# Patient Record
Sex: FEMALE | URBAN - METROPOLITAN AREA
[De-identification: names, ages, dates, MRNs, and addresses within clinical notes are randomized per-mention and may not be internally consistent; named-entity substitution may affect disease eponyms.]

---

## 2018-05-02 ENCOUNTER — APPOINTMENT (RX ONLY)
Dept: URBAN - METROPOLITAN AREA CLINIC 45 | Facility: CLINIC | Age: 68
Setting detail: DERMATOLOGY
End: 2018-05-02

## 2018-05-02 DIAGNOSIS — Z85.828 PERSONAL HISTORY OF OTHER MALIGNANT NEOPLASM OF SKIN: ICD-10-CM

## 2018-05-02 DIAGNOSIS — L85.3 XEROSIS CUTIS: ICD-10-CM

## 2018-05-02 DIAGNOSIS — L40.0 PSORIASIS VULGARIS: ICD-10-CM

## 2018-05-02 DIAGNOSIS — L57.0 ACTINIC KERATOSIS: ICD-10-CM

## 2018-05-02 PROBLEM — L20.84 INTRINSIC (ALLERGIC) ECZEMA: Status: ACTIVE | Noted: 2018-05-02

## 2018-05-02 PROCEDURE — ? OTEZLA INITIATION

## 2018-05-02 PROCEDURE — ? COUNSELING

## 2018-05-02 PROCEDURE — 99203 OFFICE O/P NEW LOW 30 MIN: CPT

## 2018-05-02 PROCEDURE — ? TREATMENT REGIMEN

## 2018-05-02 PROCEDURE — ? PHOTODYNAMIC THERAPY COUNSELING

## 2018-05-02 PROCEDURE — ? OTEZLA MONITORING

## 2018-05-02 PROCEDURE — ? PRESCRIPTION

## 2018-05-02 RX ORDER — CLOBETASOL PROPIONATE 0.5 MG/G
AEROSOL, FOAM TOPICAL
Qty: 1 | Refills: 3 | Status: ERX | COMMUNITY
Start: 2018-05-02

## 2018-05-02 RX ADMIN — CLOBETASOL PROPIONATE 1G: 0.5 AEROSOL, FOAM TOPICAL at 00:00

## 2018-05-02 ASSESSMENT — LOCATION ZONE DERM: LOCATION ZONE: NOSE

## 2018-05-02 ASSESSMENT — LOCATION DETAILED DESCRIPTION DERM
LOCATION DETAILED: NASAL DORSUM
LOCATION DETAILED: NASAL ROOT

## 2018-05-02 ASSESSMENT — LOCATION SIMPLE DESCRIPTION DERM: LOCATION SIMPLE: NOSE

## 2018-05-02 NOTE — PROCEDURE: TREATMENT REGIMEN
Detail Level: Zone
Plan: Recommended PDT for the face in fall/winter
Action 4: Continue
Other Instructions: ***Otezla 14 day titration pack given to pt today. Instructed pt to finish titration pack then start Otezla supply that will be shipped to her house until her next f/u appt***\\n\\nLocation: body\\nPrescribe: Olux E foam, 1 TOP YOLI QD when having flare ups\\nOtezla: 28 day Starter pack (14 day titration starter pack sample provided by MD office) and maintenance (30 mg PO twice daily)\\nCONT: Hydroxyzine HCL 25mg QHS to relieve itch-- pt has prescription, will not send rx today\\nDuration: 14 day titration pack given to pt\\nWeight: 135\\nHeight: 5'5\"\\nBSA: 13%\\n\\nPt presents erythematous patches and dry scaly plaques throughout her body\\nToday, pt complains of experiencing severe itching and discomfort--- PT STATES THAT HER ITCHING AND SKIN FLARE UPS HAVE PERSISTED FOR ROUGHLY 2.5 YEARS. TODAY, PT IS VERY DISTRESSED AND STATES HER CONDITION IS NEGATIVELY AFFECTING HER QUALITY OF LIFE.\\nToday, pt also presents scars and scabs that have formed from constant scratching\\nDiscussed with pt that her flare up has eczematous qualities, indicated by itch\\n\\nPatient has tried and failed (to relieve flare ups and itch):\\nA. Eliminating intake of all foods that may contribute to or trigger her skin flare ups\\nB. Clorox baths\\nC. Prescription topicals: tacrolimus 0.1% ointment, desoximetasone 0.05% cream, metronidazole 1% gel\\nD. Oral medications: levocetirizine 5 mg at night, loratadine 10 mg in the morning, cetirizine 10 mg twice daily, promethazine 50 mg once daily when having flare ups, Benadryl, hydroxyzine HCL 25 mg every 8 hours--- PT STATES THAT SHE DOES NOT TAKE ALL OF THESE ORAL MEDICATIONS AT ONCE. PT WAS INSTRUCTED BY PREVIOUS DERMATOLOGIST TO STACK/ COMBINE MEDICATIONS THAT BEST RELIEVE HER FLARE UPS\\n\\nToday, pt also states that she is either prescribed a prednisone pack or gets a steroid injection every 6 months to relieve itching and irritation on her skin--- pt states these are effective in relieving her itch (for a short period of time) but they do not clear the erythematous patches and dry, scaly plaques found on her body\\n\\nDiscussed with pt that because her condition has not improved with use of topicals, antihistamines, steroid injections, etc., will start her on Otezla with hopes that she will have better clearance of her psoriasis/ eczematous flare up and decrease of itch\\n\\nWill prescribe:\\nOtezla: 28 day Starter pack (14 day titration starter pack sample provided by MD office) and maintenance (30 mg PO twice daily)-- informed pt that Otezla can often cause diarrhea/ pt can take anti-diarrhea medication if needed\\nOlux E foam, 1 TOP YOLI QD when having flare ups--- discussed with pt that Olux E foam is a stronger steroid than those she has tried in the past, foam may be effective in calming her flare ups\\nCONT: Hydroxyzine HCL 25mg QHS to relieve itch-- pt has prescription, will not send rx today\\n\\nWill also start pt on:\\nCLN body wash QD when having flare ups -- discussed with pt that wash can be used in place of Clorox baths to relieve irritation\\nOTC Cerave Healing Ointment to re-establish and protect skin barrier\\nAllegra (fexofenadine), 1 TAB PO QD-- discussed with pt that fexofenadine is a good antihistamine for the skin; discussed with pt that she may continue cetirizine as substitute if wanted; discussed with pt that lowering histamine levels will decrease immune system and skin hypersensitivity \\n\\nDiscussed with pt that if Otezla is not effective, can start pt on Dupixent \\nInstructed pt to limit soap usage to private area and underarms\\nLab slip given to pt due ASAP: Acute hepatitis panel, CMP, CBC with diff, lipid panel, HIV, quantiferon gold\\n\\nF/u in 1 month\\n\\n

## 2018-05-17 ENCOUNTER — RX ONLY (OUTPATIENT)
Age: 68
Setting detail: RX ONLY
End: 2018-05-17

## 2018-05-17 RX ORDER — APREMILAST 30 MG/1
TABLET, FILM COATED ORAL
Qty: 60 | Refills: 6 | Status: ERX | COMMUNITY
Start: 2018-05-17

## 2018-06-06 ENCOUNTER — RX ONLY (OUTPATIENT)
Age: 68
Setting detail: RX ONLY
End: 2018-06-06

## 2018-06-06 ENCOUNTER — APPOINTMENT (RX ONLY)
Dept: URBAN - METROPOLITAN AREA CLINIC 45 | Facility: CLINIC | Age: 68
Setting detail: DERMATOLOGY
End: 2018-06-06

## 2018-06-06 DIAGNOSIS — L85.3 XEROSIS CUTIS: ICD-10-CM

## 2018-06-06 DIAGNOSIS — L40.0 PSORIASIS VULGARIS: ICD-10-CM

## 2018-06-06 DIAGNOSIS — L57.0 ACTINIC KERATOSIS: ICD-10-CM

## 2018-06-06 DIAGNOSIS — Z85.828 PERSONAL HISTORY OF OTHER MALIGNANT NEOPLASM OF SKIN: ICD-10-CM

## 2018-06-06 PROCEDURE — ? OTEZLA MONITORING

## 2018-06-06 PROCEDURE — ? PRESCRIPTION

## 2018-06-06 PROCEDURE — ? PHOTODYNAMIC THERAPY COUNSELING

## 2018-06-06 PROCEDURE — 99213 OFFICE O/P EST LOW 20 MIN: CPT

## 2018-06-06 PROCEDURE — ? TREATMENT REGIMEN

## 2018-06-06 PROCEDURE — ? COUNSELING

## 2018-06-06 RX ORDER — HYDROCORTISONE ACETATE, IODOQUINOL 19; 10 MG/G; MG/G
CREAM TOPICAL
Qty: 30 | Refills: 3 | Status: ERX | COMMUNITY
Start: 2018-06-06

## 2018-06-06 RX ORDER — ONDANSETRON HYDROCHLORIDE 4 MG/1
TABLET, FILM COATED ORAL
Qty: 30 | Refills: 1 | Status: ERX | COMMUNITY
Start: 2018-06-06

## 2018-06-06 RX ADMIN — HYDROCORTISONE ACETATE, IODOQUINOL 1G: 19; 10 CREAM TOPICAL at 00:00

## 2018-06-06 ASSESSMENT — LOCATION DETAILED DESCRIPTION DERM
LOCATION DETAILED: NASAL DORSUM
LOCATION DETAILED: NASAL ROOT

## 2018-06-06 ASSESSMENT — LOCATION ZONE DERM: LOCATION ZONE: NOSE

## 2018-06-06 ASSESSMENT — LOCATION SIMPLE DESCRIPTION DERM: LOCATION SIMPLE: NOSE

## 2018-06-06 NOTE — PROCEDURE: TREATMENT REGIMEN
Other Instructions: Location: Body\\nPrescribe: Olux E foam, 1 TOP YOLI QD when having flare ups\\nOtezla: Continue Otezla 30mg BID (patient will only take it once a day for the next week to see if this helps with the nausea she is experiencing from otezla. Patient states she has daily and nightly nausea and routinely takes Zofran with her Otezla to combat this.)\\nCONT: Hydroxyzine HCL 25mg QHS to relieve itch-- pt has prescription, will not send rx today\\n            Olux-E .05% topical Foam PRN or once a day when needed \\nVytone Cream FOR FACE IF NEEDED \\nPharmacy: [facility]\\nWeight: 130\\nHeight: 5'5\"\\nBSA: 13%\\n\\n\\nPatient is here for follow up and is responding well to Otezla and Olux E Foam.\\nToday, patient’s skin looks much better and has improved significantly, and only presents slight eczematous skin on the upper arm that she is treating with Olux and Cerave. \\nPt presented erythematous patches and dry scaly plaques throughout her body\\nPT STATES THAT HER ITCHING AND SKIN FLARE UPS HAVE PERSISTED FOR ROUGHLY 2.5 YEARS. TODAY, PT IS VERY DISTRESSED AND STATES HER CONDITION IS NEGATIVELY AFFECTING HER QUALITY OF LIFE.\\nToday, pt also presents scars and scabs that have formed from constant scratching\\nDiscussed with pt that her flare up has eczematous qualities, indicated by itch\\n\\nPatient has tried and failed (to relieve flare ups and itch):\\nA. Eliminating intake of all foods that may contribute to or trigger her skin flare ups\\nB. Clorox baths\\nC. Prescription topicals: tacrolimus 0.1% ointment, desoximetasone 0.05% cream, metronidazole 1% gel\\nD. Oral medications: levocetirizine 5 mg at night, loratadine 10 mg in the morning, cetirizine 10 mg twice daily, promethazine 50 mg once daily when having flare ups, Benadryl, hydroxyzine HCL 25 mg every 8 hours--- PT STATES THAT SHE DOES NOT TAKE ALL OF THESE ORAL MEDICATIONS AT ONCE. PT WAS INSTRUCTED BY PREVIOUS DERMATOLOGIST TO STACK/ COMBINE MEDICATIONS THAT BEST RELIEVE HER FLARE UPS\\n\\nToday, pt also states that she is either prescribed a prednisone pack or gets a steroid injection every 6 months to relieve itching and irritation on her skin--- pt states these are effective in relieving her itch (for a short period of time) but they do not clear the erythematous patches and dry, scaly plaques found on her body\\n\\nDiscussed with pt that because her condition has not improved with use of topicals, antihistamines, steroid injections, etc., will start her on Otezla with hopes that she will have better clearance of her psoriasis/ eczematous flare up and decrease of itch\\n\\nWill prescribe:\\nOtezla: 28 day Starter pack (14 day titration starter pack sample provided by MD office) and maintenance (30 mg PO twice daily)-- informed pt that Otezla can often cause diarrhea/ pt can take anti-diarrhea medication if needed\\nOlux E foam, 1 TOP YOLI QD when having flare ups--- discussed with pt that Olux E foam is a stronger steroid than those she has tried in the past, foam may be effective in calming her flare ups\\nCONT: Hydroxyzine HCL 25mg QHS to relieve itch-- pt has prescription, will not send rx today\\n\\nWill also start pt on:\\nCLN body wash QD when having flare ups -- discussed with pt that wash can be used in place of Clorox baths to relieve irritation\\nOTC Cerave Healing Ointment to re-establish and protect skin barrier\\nAllegra (fexofenadine), 1 TAB PO QD-- discussed with pt that fexofenadine is a good antihistamine for the skin; discussed with pt that she may continue cetirizine as substitute if wanted; discussed with pt that lowering histamine levels will decrease immune system and skin hypersensitivity \\n\\nDiscussed with pt that if Otezla is not effective, can start pt on Dupixent \\nInstructed pt to limit soap usage to private area and underarms\\nLab slip given to pt due ASAP: Acute hepatitis panel, CMP, CBC with diff, lipid panel, HIV, quantiferon gold\\n\\nF/u in 1 month
Action 3: Continue
Detail Level: Zone
Plan: Recommended PDT for the face in fall/winter

## 2018-07-18 ENCOUNTER — APPOINTMENT (RX ONLY)
Dept: URBAN - METROPOLITAN AREA CLINIC 45 | Facility: CLINIC | Age: 68
Setting detail: DERMATOLOGY
End: 2018-07-18

## 2018-07-18 VITALS — HEIGHT: 64 IN | WEIGHT: 115 LBS

## 2018-07-18 DIAGNOSIS — L40.0 PSORIASIS VULGARIS: ICD-10-CM

## 2018-07-18 DIAGNOSIS — L85.3 XEROSIS CUTIS: ICD-10-CM

## 2018-07-18 DIAGNOSIS — L57.0 ACTINIC KERATOSIS: ICD-10-CM

## 2018-07-18 DIAGNOSIS — Z85.828 PERSONAL HISTORY OF OTHER MALIGNANT NEOPLASM OF SKIN: ICD-10-CM

## 2018-07-18 PROCEDURE — ? PHOTODYNAMIC THERAPY COUNSELING

## 2018-07-18 PROCEDURE — ? OTEZLA MONITORING

## 2018-07-18 PROCEDURE — ? COUNSELING

## 2018-07-18 PROCEDURE — ? TREATMENT REGIMEN

## 2018-07-18 PROCEDURE — 99214 OFFICE O/P EST MOD 30 MIN: CPT

## 2018-07-18 ASSESSMENT — LOCATION ZONE DERM: LOCATION ZONE: NOSE

## 2018-07-18 ASSESSMENT — LOCATION DETAILED DESCRIPTION DERM
LOCATION DETAILED: NASAL DORSUM
LOCATION DETAILED: NASAL ROOT

## 2018-07-18 ASSESSMENT — LOCATION SIMPLE DESCRIPTION DERM: LOCATION SIMPLE: NOSE

## 2018-07-18 NOTE — PROCEDURE: TREATMENT REGIMEN
Action 3: Continue
Other Instructions: Location: Body\\nPrescribe: Olux E foam, 1 TOP YOLI QD when having flare ups\\nOtezla: Continue Otezla 30mg BID (patient will only take it once a day for the next week to see if this helps with the nausea she is experiencing from otezla. Patient states she has daily and nightly nausea and routinely takes Zofran with her Otezla to combat this.)\\nCONT: Hydroxyzine HCL 25mg QHS to relieve itch-- pt has prescription, will not send rx today\\n            Olux-E .05% topical Foam PRN or once a day when needed \\nVytone Cream FOR FACE IF NEEDED \\nPharmacy: [facility]\\nWeight: 130\\nHeight: 5'5\"\\nBSA: 13%\\n\\nPt is here for 6 weeks f/u.\\nPt discussed iman she has been taking Otezla BID, much improvmenet.\\nPt discussed that she started out taking one tablet daily since she was having some side effects.\\n\\nPt discusssed that when she takes medication at nifht \\n\\n\\n\\nPt discussed that she was taking Allegra BID, but stopped completely due to being clear.\\nPt discussed recently she had eaten food with soy in it and flared up on her sternum.\\n\\nPt states that she had a blister form and use Olux-E foam on it, which has helped with itch.\\n\\nPt also is using Vytone cream as needed on her ring finger and behind her ear (uses hearing aid) to help with irritation.\\nPt is very pleased with current treatment regimen and feels like she has her life back again.\\n\\nDiscussed with pt at some point in the future, when pt has been taking Otezla for awhile, we could slowly ween her off Otezla.\\nF/u in 3 months.\\n\\n——————\\nPatient is here for follow up and is responding well to Otezla and Olux E Foam.\\nToday, patient’s skin looks much better and has improved significantly, and only presents slight eczematous skin on the upper arm that she is treating with Olux and Cerave. \\nPt presented erythematous patches and dry scaly plaques throughout her body\\nPT STATES THAT HER ITCHING AND SKIN FLARE UPS HAVE PERSISTED FOR ROUGHLY 2.5 YEARS. TODAY, PT IS VERY DISTRESSED AND STATES HER CONDITION IS NEGATIVELY AFFECTING HER QUALITY OF LIFE.\\nToday, pt also presents scars and scabs that have formed from constant scratching\\nDiscussed with pt that her flare up has eczematous qualities, indicated by itch\\n\\nPatient has tried and failed (to relieve flare ups and itch):\\nA. Eliminating intake of all foods that may contribute to or trigger her skin flare ups\\nB. Clorox baths\\nC. Prescription topicals: tacrolimus 0.1% ointment, desoximetasone 0.05% cream, metronidazole 1% gel\\nD. Oral medications: levocetirizine 5 mg at night, loratadine 10 mg in the morning, cetirizine 10 mg twice daily, promethazine 50 mg once daily when having flare ups, Benadryl, hydroxyzine HCL 25 mg every 8 hours--- PT STATES THAT SHE DOES NOT TAKE ALL OF THESE ORAL MEDICATIONS AT ONCE. PT WAS INSTRUCTED BY PREVIOUS DERMATOLOGIST TO STACK/ COMBINE MEDICATIONS THAT BEST RELIEVE HER FLARE UPS\\n\\nToday, pt also states that she is either prescribed a prednisone pack or gets a steroid injection every 6 months to relieve itching and irritation on her skin--- pt states these are effective in relieving her itch (for a short period of time) but they do not clear the erythematous patches and dry, scaly plaques found on her body\\n\\nDiscussed with pt that because her condition has not improved with use of topicals, antihistamines, steroid injections, etc., will start her on Otezla with hopes that she will have better clearance of her psoriasis/ eczematous flare up and decrease of itch\\n\\nWill prescribe:\\nOtezla: 28 day Starter pack (14 day titration starter pack sample provided by MD office) and maintenance (30 mg PO twice daily)-- informed pt that Otezla can often cause diarrhea/ pt can take anti-diarrhea medication if needed\\nOlux E foam, 1 TOP YOLI QD when having flare ups--- discussed with pt that Olux E foam is a stronger steroid than those she has tried in the past, foam may be effective in calming her flare ups\\nCONT: Hydroxyzine HCL 25mg QHS to relieve itch-- pt has prescription, will not send rx today\\n\\nWill also start pt on:\\nCLN body wash QD when having flare ups -- discussed with pt that wash can be used in place of Clorox baths to relieve irritation\\nOTC Cerave Healing Ointment to re-establish and protect skin barrier\\nAllegra (fexofenadine), 1 TAB PO QD-- discussed with pt that fexofenadine is a good antihistamine for the skin; discussed with pt that she may continue cetirizine as substitute if wanted; discussed with pt that lowering histamine levels will decrease immune system and skin hypersensitivity \\n\\nDiscussed with pt that if Otezla is not effective, can start pt on Dupixent \\nInstructed pt to limit soap usage to private area and underarms\\nLab slip given to pt due ASAP: Acute hepatitis panel, CMP, CBC with diff, lipid panel, HIV, quantiferon gold\\n\\nF/u in 1 month
Detail Level: Zone
Plan: Recommended PDT for the face in fall/winter

## 2018-10-24 ENCOUNTER — APPOINTMENT (RX ONLY)
Dept: URBAN - METROPOLITAN AREA CLINIC 45 | Facility: CLINIC | Age: 68
Setting detail: DERMATOLOGY
End: 2018-10-24

## 2018-10-24 DIAGNOSIS — L40.0 PSORIASIS VULGARIS: ICD-10-CM

## 2018-10-24 DIAGNOSIS — L85.3 XEROSIS CUTIS: ICD-10-CM

## 2018-10-24 DIAGNOSIS — Z85.828 PERSONAL HISTORY OF OTHER MALIGNANT NEOPLASM OF SKIN: ICD-10-CM

## 2018-10-24 DIAGNOSIS — L57.0 ACTINIC KERATOSIS: ICD-10-CM

## 2018-10-24 PROCEDURE — 99214 OFFICE O/P EST MOD 30 MIN: CPT

## 2018-10-24 PROCEDURE — ? OTEZLA MONITORING

## 2018-10-24 PROCEDURE — ? PHOTODYNAMIC THERAPY COUNSELING

## 2018-10-24 PROCEDURE — ? PRESCRIPTION SAMPLES PROVIDED

## 2018-10-24 PROCEDURE — ? TREATMENT REGIMEN

## 2018-10-24 PROCEDURE — ? COUNSELING

## 2018-10-24 ASSESSMENT — LOCATION DETAILED DESCRIPTION DERM
LOCATION DETAILED: NASAL DORSUM
LOCATION DETAILED: RIGHT MENTAL CREASE
LOCATION DETAILED: NASAL ROOT
LOCATION DETAILED: INFERIOR MID FOREHEAD
LOCATION DETAILED: LEFT INFERIOR MEDIAL MALAR CHEEK
LOCATION DETAILED: RIGHT CENTRAL MALAR CHEEK

## 2018-10-24 ASSESSMENT — LOCATION ZONE DERM
LOCATION ZONE: FACE
LOCATION ZONE: NOSE

## 2018-10-24 ASSESSMENT — LOCATION SIMPLE DESCRIPTION DERM
LOCATION SIMPLE: RIGHT CHEEK
LOCATION SIMPLE: INFERIOR FOREHEAD
LOCATION SIMPLE: LEFT CHEEK
LOCATION SIMPLE: CHIN
LOCATION SIMPLE: NOSE

## 2018-10-24 NOTE — PROCEDURE: TREATMENT REGIMEN
Detail Level: Zone
Action 1: Continue
Other Instructions: Location: Body\\nPharmacy: [facility]\\nWeight: 130\\nHeight: 5'5\"\\nBSA: 13%\\n\\nPrescribe: CONT: Otezla 30mg, 1 TAB PO QD-- pt states that taking 2 tablets daily leads to severe cough; pt states that her psoriasis is still well controlled while on 1 tab daily CONT: Zofran 4 mg, 1 TAB PO PRN when experiencing nightly nausea--- pt states that when needed, pt takes Zofran to combat nausea\\nCONT: Hydroxyzine HCL 25mg QHS to relieve itch\\nCONT: Olux-E .05% topical Foam PRN when having flare up\\nCONT: Vytone Cream, 1 TOP YOLI PRN when having flare up (for face and ears)\\nCONT: Allegra 180 mg and Children's Allegra (90 mg total)--- pt states that taking Allegra 180 mg, 2 TAB QD causes stomach upset and drowsiness; pt is currently taking Children's Allegra 30 mg, 3 TAB PO QD (90 mg total) and states it does not lead to any side effects; pt also states that although taking Children's Allegra 90 mg does not cause stomach upset or drowsiness, it is not as effective in lowering histamine levels/ calming allergies; INSTRUCTED PT TO TAKE 1 ALLEGRA 180 MG AND 90 MG (3 TAB) OF CHILDREN'S ALLEGRA DAILY\\nSamples given: Eucrisa, 1 TOP YOLI PRN on face when having flare up; will send prescription if Pt likes product\\n\\nPt is here for 3 month f/u and states her condition is still well controlled\\nToday, pt states that she has small flare ups but in addition to Otezla, usage of topicals (Olux and Vytone) are effective in reducing irritation/ calming flare ups\\nToday, pt complains of some itching on the ears and left forearm\\n\\nToday, pt states that when taking Otezla 30 mg, 2 TAB PO QD, she experiences severe coughing--- pt has switched to 1 tablet daily for the past 4-6 weeks and states the coughing has stopped\\nDiscussed with pt that since her psoriasis is not flareing up while on Otezla 30 mg, 1 TAB QD, will continue pt on same dosing\\nPt also states that at times, Otezla can cause nausea at night--- pt is currently taking/ will continue pt on Zofran to combat nausea while on Otezla\\n\\nPt is also currently taking/ using and will continue pt on:\\nHydroxyzine HCL 25mg QHS to relieve itch\\nOlux-E .05% topical Foam PRN when having flare up\\nVytone Cream, 1 TOP YOLI PRN when having flare up (for face and ears)\\nAllegra 180 mg and Children's Allegra (90 mg total)--- pt states that taking Allegra 180 mg, 2 TAB QD causes stomach upset and drowsiness; pt is currently taking Children's Allegra 30 mg, 3 TAB PO QD (90 mg total) and states it does not lead to any side effects; pt also states that although taking Children's Allegra 90 mg does not cause stomach upset or drowsiness, it is not as effective in lowering histamine levels/ calming allergies; INSTRUCTED PT TO TAKE 1 ALLEGRA 180 MG AND 90 MG (3 TAB) OF CHILDREN'S ALLEGRA DAILY\\nSamples given: Eucrisa, 1 TOP OYLI PRN on face when having flare up; will send prescription if Pt likes product\\n\\nF/u in 3 months
Plan: PDT face in fall/winter\\n\\nToday, Pt states she is worried about getting PDT for the face because she is afraid she will have an allergic reaction to levulan\\nToday, Pt states that her skin is very thin and she prefers to have shorter levulan incubation period\\nDiscussed with Pt that we can also stop activation period early if she experiences a lot of pain\\nHas history of cold sores

## 2018-11-08 ENCOUNTER — APPOINTMENT (RX ONLY)
Dept: URBAN - METROPOLITAN AREA CLINIC 45 | Facility: CLINIC | Age: 68
Setting detail: DERMATOLOGY
End: 2018-11-08

## 2018-11-08 DIAGNOSIS — L57.0 ACTINIC KERATOSIS: ICD-10-CM

## 2018-11-08 PROCEDURE — ? PRESCRIPTION

## 2018-11-08 PROCEDURE — ? PDT: BLUE

## 2018-11-08 PROCEDURE — 96567 PDT DSTR PRMLG LES SKN: CPT

## 2018-11-08 PROCEDURE — ? TREATMENT REGIMEN

## 2018-11-08 RX ORDER — SULFAMETHOXAZOLE AND TRIMETHOPRIM 800; 160 MG/1; MG/1
TABLET ORAL
Qty: 20 | Refills: 0 | Status: CANCELLED

## 2018-11-08 RX ORDER — PREDNISONE 20 MG/1
TABLET ORAL
Qty: 5 | Refills: 0 | Status: ERX | COMMUNITY
Start: 2018-11-08

## 2018-11-08 RX ORDER — VALACYCLOVIR HYDROCHLORIDE 1 G/1
TABLET, FILM COATED ORAL
Qty: 14 | Refills: 0 | Status: ERX | COMMUNITY
Start: 2018-11-08

## 2018-11-08 RX ADMIN — VALACYCLOVIR HYDROCHLORIDE: 1 TABLET, FILM COATED ORAL at 00:00

## 2018-11-08 RX ADMIN — PREDNISONE: 20 TABLET ORAL at 00:00

## 2018-11-08 ASSESSMENT — LOCATION DETAILED DESCRIPTION DERM
LOCATION DETAILED: RIGHT CENTRAL BUCCAL CHEEK
LOCATION DETAILED: RIGHT INFERIOR HELIX
LOCATION DETAILED: RIGHT FOREHEAD
LOCATION DETAILED: LEFT INFERIOR MEDIAL FOREHEAD
LOCATION DETAILED: LEFT CENTRAL BUCCAL CHEEK
LOCATION DETAILED: LEFT MEDIAL MALAR CHEEK
LOCATION DETAILED: RIGHT LATERAL EYEBROW
LOCATION DETAILED: NASAL SUPRATIP
LOCATION DETAILED: RIGHT LATERAL MALAR CHEEK
LOCATION DETAILED: LEFT FOREHEAD
LOCATION DETAILED: RIGHT INFERIOR FOREHEAD
LOCATION DETAILED: LEFT INFERIOR LATERAL FOREHEAD
LOCATION DETAILED: NASAL ROOT
LOCATION DETAILED: LEFT CENTRAL MALAR CHEEK
LOCATION DETAILED: LEFT SUPERIOR HELIX
LOCATION DETAILED: LEFT CHIN
LOCATION DETAILED: RIGHT ANTERIOR EARLOBE
LOCATION DETAILED: LEFT ANTIHELIX
LOCATION DETAILED: INFERIOR MID FOREHEAD
LOCATION DETAILED: LEFT ANTERIOR EARLOBE
LOCATION DETAILED: RIGHT SUPERIOR HELIX
LOCATION DETAILED: RIGHT MEDIAL MALAR CHEEK
LOCATION DETAILED: LEFT INFERIOR CENTRAL MALAR CHEEK
LOCATION DETAILED: GLABELLA
LOCATION DETAILED: RIGHT INFERIOR CENTRAL MALAR CHEEK

## 2018-11-08 ASSESSMENT — LOCATION SIMPLE DESCRIPTION DERM
LOCATION SIMPLE: RIGHT EAR
LOCATION SIMPLE: INFERIOR FOREHEAD
LOCATION SIMPLE: LEFT CHEEK
LOCATION SIMPLE: RIGHT FOREHEAD
LOCATION SIMPLE: LEFT EAR
LOCATION SIMPLE: CHIN
LOCATION SIMPLE: RIGHT CHEEK
LOCATION SIMPLE: GLABELLA
LOCATION SIMPLE: RIGHT EYEBROW
LOCATION SIMPLE: NOSE
LOCATION SIMPLE: LEFT FOREHEAD

## 2018-11-08 ASSESSMENT — LOCATION ZONE DERM
LOCATION ZONE: FACE
LOCATION ZONE: NOSE
LOCATION ZONE: EAR

## 2018-11-08 ASSESSMENT — PAIN INTENSITY VAS: HOW INTENSE IS YOUR PAIN 0 BEING NO PAIN, 10 BEING THE MOST SEVERE PAIN POSSIBLE?: 4/10 PAIN

## 2018-11-08 NOTE — PROCEDURE: PDT: BLUE
Debridement Text (Will Only Render In Visit Note If You Select Debridement Option Under Who Performed The Pdt Field): Prior to application of the photodynamic medication the hyperkeratotic lesions were curetted to make them more amenable to therapy.
Incubation Time: 02:00:00
Which Photosensitizer Was Used: Levulan
Number Of Kerasticks/Tubes Billed For: 1
Detail Level: Zone
Occlusion: No
Who Performed The Pdt?: Performed by Nurse, MA or Aesthetician ([id number])
Post-Care Instructions: I reviewed with the patient in detail post-care instructions. Patient is to avoid sunlight for the next 2 days, and wear sun protection. Patients may expect sunburn like redness, discomfort and scabbing.
Illumination Time: 00:16:40
Expiration Date (Optional): 09/20
Light Source: Germán-U
Pre-Procedure Text: The treatment areas were cleaned and prepped in the usual fashion.
Treatment Number: 0
Anesthesia Type: 1% lidocaine with epinephrine
Lot # (Optional): [id number]
Consent: Written consent obtained.  The risks were reviewed with the patient including but not limited to: pigmentary changes, pain, blistering, scabbing, redness, and the remote possibility of scarring.

## 2018-12-20 ENCOUNTER — APPOINTMENT (RX ONLY)
Dept: URBAN - METROPOLITAN AREA CLINIC 45 | Facility: CLINIC | Age: 68
Setting detail: DERMATOLOGY
End: 2018-12-20

## 2018-12-20 DIAGNOSIS — L57.0 ACTINIC KERATOSIS: ICD-10-CM

## 2018-12-20 DIAGNOSIS — L40.0 PSORIASIS VULGARIS: ICD-10-CM

## 2018-12-20 PROCEDURE — ? COUNSELING

## 2018-12-20 PROCEDURE — 99213 OFFICE O/P EST LOW 20 MIN: CPT

## 2018-12-20 PROCEDURE — ? TREATMENT REGIMEN

## 2018-12-20 PROCEDURE — ? OTEZLA MONITORING

## 2018-12-20 ASSESSMENT — LOCATION SIMPLE DESCRIPTION DERM
LOCATION SIMPLE: RIGHT EYEBROW
LOCATION SIMPLE: LEFT CHEEK
LOCATION SIMPLE: NOSE
LOCATION SIMPLE: LEFT EYEBROW
LOCATION SIMPLE: RIGHT CHEEK

## 2018-12-20 ASSESSMENT — LOCATION DETAILED DESCRIPTION DERM
LOCATION DETAILED: RIGHT LATERAL EYEBROW
LOCATION DETAILED: LEFT CENTRAL MALAR CHEEK
LOCATION DETAILED: LEFT LATERAL EYEBROW
LOCATION DETAILED: RIGHT CENTRAL MALAR CHEEK
LOCATION DETAILED: NASAL DORSUM

## 2018-12-20 ASSESSMENT — LOCATION ZONE DERM
LOCATION ZONE: FACE
LOCATION ZONE: NOSE

## 2018-12-20 NOTE — PROCEDURE: TREATMENT REGIMEN
Detail Level: Zone
Action 2: Continue
Other Instructions: Location: Body\\nPharmacy: [facility]\\nWeight: 130\\nHeight: 5'5\"\\nBSA: 13%\\n\\nPrescribe: CONT: Otezla 30mg, 1 TAB PO QD-- pt states that taking 2 tablets daily leads to severe cough; pt states that her psoriasis is still well controlled while on 1 tab daily CONT: Zofran 4 mg, 1 TAB PO PRN when experiencing nightly nausea--- pt states that when needed, pt takes Zofran to combat nausea\\nCONT: Hydroxyzine HCL 25mg QHS to relieve itch\\nCONT: Olux-E .05% topical Foam PRN when having flare up\\nCONT: Vytone Cream, 1 TOP YOLI PRN when having flare up (for face and ears)\\nCONT: Allegra 180 mg and Children's Allegra (90 mg total)--- pt states that taking Allegra 180 mg, 2 TAB QD causes stomach upset and drowsiness; pt is currently taking Children's Allegra 30 mg, 3 TAB PO QD (90 mg total) and states it does not lead to any side effects; pt also states that although taking Children's Allegra 90 mg does not cause stomach upset or drowsiness, it is not as effective in lowering histamine levels/ calming allergies; INSTRUCTED PT TO TAKE 1 ALLEGRA 180 MG AND 90 MG (3 TAB) OF CHILDREN'S ALLEGRA DAILY\\nSamples given: Eucrisa, 1 TOP YOLI PRN on face when having flare up; will send prescription if Pt likes product\\n\\nPt is here for f/u.\\nPt discussed that she is taking Otezla one tablet nightly.\\nPt states that her coughing, nausea, and diarrhea has subsided.\\nPt states that taking Allegra two a day makes her feel wired.\\nDiscussed the coughing sounds like reflux \\nDiscussed with pt to try proton pump inhibitors (OTC).\\nDiscussed with pt that we try to switch her antihistamine to Zyrtec.\\n\\nPt is here for 3 month f/u and states her condition is still well controlled\\nToday, pt states that she has small flare ups but in addition to Otezla, usage of topicals (Olux and Vytone) are effective in reducing irritation/ calming flare ups\\nToday, pt complains of some itching on the ears and left forearm\\n\\nToday, pt states that when taking Otezla 30 mg, 2 TAB PO QD, she experiences severe coughing--- pt has switched to 1 tablet daily for the past 4-6 weeks and states the coughing has stopped\\nDiscussed with pt that since her psoriasis is not flareing up while on Otezla 30 mg, 1 TAB QD, will continue pt on same dosing\\nPt also states that at times, Otezla can cause nausea at night--- pt is currently taking/ will continue pt on Zofran to combat nausea while on Otezla\\n\\nPt is also currently taking/ using and will continue pt on:\\nHydroxyzine HCL 25mg QHS to relieve itch\\nOlux-E .05% topical Foam PRN when having flare up\\nVytone Cream, 1 TOP YOLI PRN when having flare up (for face and ears)\\nAllegra 180 mg and Children's Allegra (90 mg total)--- pt states that taking Allegra 180 mg, 2 TAB QD causes stomach upset and drowsiness; pt is currently taking Children's Allegra 30 mg, 3 TAB PO QD (90 mg total) and states it does not lead to any side effects; pt also states that although taking Children's Allegra 90 mg does not cause stomach upset or drowsiness, it is not as effective in lowering histamine levels/ calming allergies; INSTRUCTED PT TO TAKE 1 ALLEGRA 180 MG AND 90 MG (3 TAB) OF CHILDREN'S ALLEGRA DAILY\\nSamples given: Eucrisa, 1 TOP YOLI PRN on face when having flare up; will send prescription if Pt likes product\\n\\nF/u in 3 months
Plan: Location: Face\\nTreatment: Recommended Yearly PDT\\n\\nPatient is here for PDT follow up and had a great response to treatment.  Patient states that they experienced some redness and peeling, for approximately 3-5 days post blue light treatment.  Patient states that they notice that their skin has become much more smooth.  Patient is very pleased with results, discussed with patient that blue light therapy is not a \"one and done\" treatment and should do one every year in order to slow down the pre cancerous lesions that patient has accumulated over the years due to extensive sun damage. Advised patient to continue to be very diligent with wearing a good sunscreen that has either zinc oxide or titanium. Overall, patient has shown significant improvement from the blue light therapy treatment.

## 2019-01-24 ENCOUNTER — RX ONLY (OUTPATIENT)
Age: 69
Setting detail: RX ONLY
End: 2019-01-24

## 2019-01-24 ENCOUNTER — APPOINTMENT (RX ONLY)
Dept: URBAN - METROPOLITAN AREA CLINIC 45 | Facility: CLINIC | Age: 69
Setting detail: DERMATOLOGY
End: 2019-01-24

## 2019-01-24 DIAGNOSIS — L57.0 ACTINIC KERATOSIS: ICD-10-CM

## 2019-01-24 DIAGNOSIS — L40.0 PSORIASIS VULGARIS: ICD-10-CM

## 2019-01-24 PROCEDURE — 17003 DESTRUCT PREMALG LES 2-14: CPT

## 2019-01-24 PROCEDURE — 99214 OFFICE O/P EST MOD 30 MIN: CPT | Mod: 25

## 2019-01-24 PROCEDURE — ? PHOTODYNAMIC THERAPY COUNSELING

## 2019-01-24 PROCEDURE — ? LIQUID NITROGEN

## 2019-01-24 PROCEDURE — 17000 DESTRUCT PREMALG LESION: CPT

## 2019-01-24 PROCEDURE — ? TREATMENT REGIMEN

## 2019-01-24 PROCEDURE — ? OTEZLA MONITORING

## 2019-01-24 PROCEDURE — ? COUNSELING

## 2019-01-24 RX ORDER — CETIRIZINE HYDROCHLORIDE 10 MG/1
TABLET ORAL
Qty: 60 | Refills: 6 | Status: ERX | COMMUNITY
Start: 2019-01-24

## 2019-01-24 ASSESSMENT — LOCATION DETAILED DESCRIPTION DERM
LOCATION DETAILED: LEFT SUPERIOR LATERAL MALAR CHEEK
LOCATION DETAILED: LEFT INFERIOR CENTRAL MALAR CHEEK
LOCATION DETAILED: LEFT UPPER CUTANEOUS LIP
LOCATION DETAILED: LEFT MEDIAL MALAR CHEEK
LOCATION DETAILED: LEFT LATERAL EYEBROW
LOCATION DETAILED: GLABELLA
LOCATION DETAILED: RIGHT CENTRAL MALAR CHEEK
LOCATION DETAILED: NASAL SUPRATIP
LOCATION DETAILED: LEFT CENTRAL MALAR CHEEK
LOCATION DETAILED: RIGHT UPPER CUTANEOUS LIP
LOCATION DETAILED: LEFT INFERIOR FOREHEAD
LOCATION DETAILED: LEFT INFERIOR LATERAL FOREHEAD
LOCATION DETAILED: PHILTRUM
LOCATION DETAILED: RIGHT LATERAL EYEBROW
LOCATION DETAILED: LEFT CENTRAL EYEBROW
LOCATION DETAILED: LEFT MID TEMPLE
LOCATION DETAILED: NASAL DORSUM
LOCATION DETAILED: NASAL ROOT

## 2019-01-24 ASSESSMENT — LOCATION ZONE DERM
LOCATION ZONE: NOSE
LOCATION ZONE: LIP
LOCATION ZONE: FACE

## 2019-01-24 ASSESSMENT — LOCATION SIMPLE DESCRIPTION DERM
LOCATION SIMPLE: LEFT TEMPLE
LOCATION SIMPLE: LEFT EYEBROW
LOCATION SIMPLE: LEFT FOREHEAD
LOCATION SIMPLE: GLABELLA
LOCATION SIMPLE: RIGHT LIP
LOCATION SIMPLE: LEFT LIP
LOCATION SIMPLE: RIGHT EYEBROW
LOCATION SIMPLE: RIGHT CHEEK
LOCATION SIMPLE: LEFT CHEEK
LOCATION SIMPLE: UPPER LIP
LOCATION SIMPLE: NOSE

## 2019-01-24 NOTE — PROCEDURE: TREATMENT REGIMEN
Plan: Location: Face\\nTreatment: Recommended Yearly PDT\\n\\nPatient is here for PDT follow up and had a great response to treatment.  Patient states that they experienced some redness and peeling, for approximately 3-5 days post blue light treatment.  Patient states that they notice that their skin has become much more smooth.  Patient is very pleased with results, discussed with patient that blue light therapy is not a \"one and done\" treatment and should do one every year in order to slow down the pre cancerous lesions that patient has accumulated over the years due to extensive sun damage. Advised patient to continue to be very diligent with wearing a good sunscreen that has either zinc oxide or titanium. Overall, patient has shown significant improvement from the blue light therapy treatment.
Detail Level: Zone
Action 3: Continue
Other Instructions: Location: Body\\nPharmacy: [facility]\\nWeight: 130\\nHeight: 5'5\"\\nBSA: 13%\\n\\nPrescribe: CONT: Otezla 30mg, 1 TAB PO QD-- pt states that taking 2 tablets daily leads to severe cough; pt states that her psoriasis is still well controlled while on 1 tab daily CONT: Zofran 4 mg, 1 TAB PO PRN when experiencing nightly nausea--- pt states that when needed, pt takes Zofran to combat nausea\\nCONT: Hydroxyzine HCL 25mg QHS to relieve itch\\nCONT: Olux-E .05% topical Foam PRN when having flare up\\nCONT: Vytone Cream, 1 TOP YOLI PRN when having flare up (for face and ears)\\nCONT: Cetirizine 10mg tablets po QD\\n\\n\\nPt is here for f/u.\\nPt discussed that she is taking Otezla one tablet nightly.\\nPt states that her coughing, nausea, and diarrhea has subsided after introducing a antihistamine into her regimen.\\nPatient states she would like to try taking Otezla BID since she is longer coughing, discussed with patient that this is fine and she can start slowly by taking it every other day.\\nPt states that taking Allegra two a day makes her feel wired, but states that Zyrtec is ok. \\nToday, pt states that she has small flare ups but in addition to Otezla, usage of topicals (Olux and Vytone) are effective in reducing irritation/ calming flare ups\\nToday, pt complains of some itching on the ears and left forearm\\n\\nToday, pt states that when taking Otezla 30 mg, 2 TAB PO QD, she experienced severe coughing--- pt has switched to 1 tablet daily for the past 3 months  and states the coughing has stopped\\nDiscussed with pt that since her psoriasis is not flaring up while on Otezla 30 mg, 1 TAB QD, will continue pt on same dosing\\nPt also states that at times, Otezla can cause nausea at night--- pt is currently taking/ will continue pt on Zofran to combat nausea while on Otezla\\n\\nPt is also currently taking/ using and will continue pt on:\\nHydroxyzine HCL 25mg QHS to relieve itch\\nOlux-E .05% topical Foam PRN when having flare up\\nVytone Cream, 1 TOP YOLI PRN when having flare up (for face and ears)\\nZyrtec 10mg tablet\\nSamples given: Eucrisa, 1 TOP YOLI PRN on face when having flare up; will send prescription if Pt likes product\\n\\nF/u in 3 months

## 2019-01-24 NOTE — PROCEDURE: LIQUID NITROGEN
Post-Care Instructions: I reviewed with the patient in detail post-care instructions. Patient is to wear sunprotection, and avoid picking at any of the treated lesions. Pt may apply Vaseline to crusted or scabbing areas.
Duration Of Freeze Thaw-Cycle (Seconds): 5
Render Post-Care Instructions In Note?: no
Detail Level: Generalized
Number Of Freeze-Thaw Cycles: 2 freeze-thaw cycles
Consent: The patient's consent was obtained including but not limited to risks of crusting, scabbing, blistering, scarring, darker or lighter pigmentary change, recurrence, incomplete removal and infection.

## 2019-05-22 ENCOUNTER — APPOINTMENT (RX ONLY)
Dept: URBAN - METROPOLITAN AREA CLINIC 45 | Facility: CLINIC | Age: 69
Setting detail: DERMATOLOGY
End: 2019-05-22

## 2019-05-22 DIAGNOSIS — Z85.828 PERSONAL HISTORY OF OTHER MALIGNANT NEOPLASM OF SKIN: ICD-10-CM

## 2019-05-22 DIAGNOSIS — L82.0 INFLAMED SEBORRHEIC KERATOSIS: ICD-10-CM

## 2019-05-22 DIAGNOSIS — L40.0 PSORIASIS VULGARIS: ICD-10-CM

## 2019-05-22 PROCEDURE — ? COUNSELING

## 2019-05-22 PROCEDURE — ? TREATMENT REGIMEN

## 2019-05-22 PROCEDURE — ? LIQUID NITROGEN

## 2019-05-22 PROCEDURE — 99214 OFFICE O/P EST MOD 30 MIN: CPT | Mod: 25

## 2019-05-22 PROCEDURE — 17110 DESTRUCTION B9 LES UP TO 14: CPT

## 2019-05-22 PROCEDURE — ? OTEZLA MONITORING

## 2019-05-22 ASSESSMENT — LOCATION SIMPLE DESCRIPTION DERM
LOCATION SIMPLE: NOSE
LOCATION SIMPLE: RIGHT LOWER BACK
LOCATION SIMPLE: RIGHT UPPER BACK

## 2019-05-22 ASSESSMENT — PGA PSORIASIS: PGA PSORIASIS: CLEAR (NO ELEVATION, SCALE, OR ERYTHEMA, EXCEPT FOR RESIDUAL DISCOLORATION)

## 2019-05-22 ASSESSMENT — LOCATION ZONE DERM
LOCATION ZONE: TRUNK
LOCATION ZONE: NOSE

## 2019-05-22 ASSESSMENT — LOCATION DETAILED DESCRIPTION DERM
LOCATION DETAILED: NASAL ROOT
LOCATION DETAILED: RIGHT SUPERIOR UPPER BACK
LOCATION DETAILED: RIGHT SUPERIOR MEDIAL MIDBACK
LOCATION DETAILED: NASAL DORSUM

## 2019-05-22 NOTE — PROCEDURE: MIPS QUALITY
Quality 337: Tuberculosis Prevention For Psoriasis And Psoriatic Arthritis Patients On A Biological Immune Response Modifier: Patient has a documented negative annual TB screening.
Quality 110: Preventive Care And Screening: Influenza Immunization: Influenza Immunization not Administered for Documented Reasons.
Quality 111:Pneumonia Vaccination Status For Older Adults: Pneumococcal Vaccination not Administered or Previously Received, Reason not Otherwise Specified
Quality 131: Pain Assessment And Follow-Up: Pain assessment using a standardized tool is documented as negative, no follow-up plan required
Detail Level: Detailed
Quality 410: Psoriasis Clinical Response To Oral Systemic Or Biologic Mediations: Psoriasis Assessment Tool Documented, Met Specified Benchmark
Quality 130: Documentation Of Current Medications In The Medical Record: Current Medications Documented

## 2019-05-22 NOTE — PROCEDURE: LIQUID NITROGEN
Medical Necessity Clause: This procedure was medically necessary because the lesions that were treated were:
Number Of Freeze-Thaw Cycles: 3 freeze-thaw cycles
Medical Necessity Information: It is in your best interest to select a reason for this procedure from the list below. All of these items fulfill various CMS LCD requirements except the new and changing color options.
Add 52 Modifier (Optional): no
Consent: The patient's consent was obtained including but not limited to risks of crusting, scabbing, blistering, scarring, darker or lighter pigmentary change, recurrence, incomplete removal and infection.
Detail Level: Detailed
Post-Care Instructions: I reviewed with the patient in detail post-care instructions. Patient is to wear sunprotection, and avoid picking at any of the treated lesions. Pt may apply Vaseline to crusted or scabbing areas.

## 2019-05-22 NOTE — PROCEDURE: TREATMENT REGIMEN
Detail Level: Zone
Action 4: Continue
Other Instructions: Location: Body\\nPharmacy: [facility]\\nWeight: 130\\nHeight: 5'5\"\\nBSA: 13%\\nFull Labs including TB: 5/2018\\nPrescribe: CONT: Otezla 30mg, 1 TAB PO QD-- pt states that taking 2 tablets once daily leads to severe cough; pt states that her psoriasis is still well controlled while on 1 tab daily CONT: Zofran 4 mg, 1 TAB PO PRN when experiencing nightly nausea--- pt states that when needed, pt takes Zofran to combat nausea\\nCONT: Hydroxyzine HCL 25mg QHS to relieve itch\\nCONT: Olux-E .05% topical Foam PRN when having flare up\\nCONT: Vytone Cream, 1 TOP YOLI PRN when having flare up (for face and ears)\\nCONT: Cetirizine 10mg tablets po QD\\n\\nPt is here for f/u.\\nPt discussed that she is taking Otezla one tablet nightly with benefit, and no side effects except occasional nausea that has gotten better over time\\nPatient is also taking Zyrtec once daily and adjusting her diet with benefit\\nPatient notes she is still taking Zofran for her nausea \\nToday, pt states that she has a small flare on her neck that is treating with Olux-E with benefit\\n\\nDiscussed with pt that since her psoriasis is not flaring up while on Otezla 30 mg, 1 TAB QD, will continue pt on same dosing\\nPt also states that at times, Otezla can cause nausea at night--- pt is currently taking/ will continue pt on Zofran to combat nausea while on Otezla\\n\\nF/u in 4 months

## 2019-09-18 ENCOUNTER — APPOINTMENT (RX ONLY)
Dept: URBAN - METROPOLITAN AREA CLINIC 45 | Facility: CLINIC | Age: 69
Setting detail: DERMATOLOGY
End: 2019-09-18

## 2019-09-18 DIAGNOSIS — L57.0 ACTINIC KERATOSIS: ICD-10-CM

## 2019-09-18 DIAGNOSIS — L98.8 OTHER SPECIFIED DISORDERS OF THE SKIN AND SUBCUTANEOUS TISSUE: ICD-10-CM

## 2019-09-18 DIAGNOSIS — L82.0 INFLAMED SEBORRHEIC KERATOSIS: ICD-10-CM

## 2019-09-18 DIAGNOSIS — L40.0 PSORIASIS VULGARIS: ICD-10-CM

## 2019-09-18 DIAGNOSIS — Z85.828 PERSONAL HISTORY OF OTHER MALIGNANT NEOPLASM OF SKIN: ICD-10-CM

## 2019-09-18 PROCEDURE — ? OTEZLA MONITORING

## 2019-09-18 PROCEDURE — ? TREATMENT REGIMEN

## 2019-09-18 PROCEDURE — ? LIQUID NITROGEN

## 2019-09-18 PROCEDURE — 17000 DESTRUCT PREMALG LESION: CPT | Mod: 59

## 2019-09-18 PROCEDURE — ? PHOTODYNAMIC THERAPY COUNSELING

## 2019-09-18 PROCEDURE — ? ORDER TESTS

## 2019-09-18 PROCEDURE — ? MEDICAL CONSULTATION: FILLERS

## 2019-09-18 PROCEDURE — 99213 OFFICE O/P EST LOW 20 MIN: CPT | Mod: 25

## 2019-09-18 PROCEDURE — 17003 DESTRUCT PREMALG LES 2-14: CPT | Mod: 59

## 2019-09-18 PROCEDURE — ? COUNSELING

## 2019-09-18 ASSESSMENT — LOCATION SIMPLE DESCRIPTION DERM
LOCATION SIMPLE: NOSE
LOCATION SIMPLE: RIGHT UPPER BACK
LOCATION SIMPLE: RIGHT LOWER BACK
LOCATION SIMPLE: LEFT NOSE

## 2019-09-18 ASSESSMENT — LOCATION DETAILED DESCRIPTION DERM
LOCATION DETAILED: RIGHT SUPERIOR UPPER BACK
LOCATION DETAILED: NASAL SUPRATIP
LOCATION DETAILED: RIGHT SUPERIOR MEDIAL MIDBACK
LOCATION DETAILED: LEFT NASAL SIDEWALL
LOCATION DETAILED: NASAL DORSUM
LOCATION DETAILED: NASAL ROOT

## 2019-09-18 ASSESSMENT — LOCATION ZONE DERM
LOCATION ZONE: TRUNK
LOCATION ZONE: NOSE

## 2019-09-18 NOTE — PROCEDURE: TREATMENT REGIMEN
Action 1: Continue
Other Instructions: Location: Body\\nPharmacy: [facility]\\nWeight: 130\\nHeight: 5'5\"\\nBSA: 13%\\nFull Labs including TB: 5/2018\\nPrescribe: CONT: Otezla 30mg, 1 TAB PO QD-- pt states that taking 2 tablets once daily leads to severe cough; pt states that her psoriasis is still well controlled while on 1 tab daily CONT: Zofran 4 mg, 1 TAB PO PRN when experiencing nightly nausea--- pt states that when needed, pt takes Zofran to combat nausea\\nCONT: Hydroxyzine HCL 25mg QHS to relieve itch\\nCONT: Olux-E .05% topical Foam PRN when having flare up\\nCONT: Vytone Cream, 1 TOP YOLI PRN when having flare up (for face and ears)\\nCONT: Cetirizine 10mg tablets po QD\\n\\nPt is here for f/u.\\nPt discussed that she is taking Otezla one tablet nightly with benefit, and no side effects except occasional nausea that has gotten better over time.\\nPatient is also taking Zyrtec once daily and adjusting her diet with benefit\\nPatient notes she is still taking Zofran for her nausea \\nToday, pt states that she has a small flare on her neck that is treating with Olux-E with benefit\\n\\nPt discussed that she is taking Cetirizine 10mg BID as well.\\n\\n\\nDiscussed with pt that since her psoriasis is not flaring up while on Otezla 30 mg, 1 TAB QD, will continue pt on same dosing\\nPt also states that at times, Otezla can cause nausea at night--- pt is currently taking/ will continue pt on Zofran to combat nausea while on Otezla\\n\\nF/u in 4 months
Detail Level: Zone
Plan: Location: face\\n\\nAdvised pt to get PDT done in the Fall due to too numerous to count non hyperkeratotic erythematous lesions on area.\\n\\n** Pt has had LN2 and/or has had chemotherapy (5FU) performed in the past and is getting repeated treatments. **
Plan: Location: hands\\n\\nCan treat with

## 2019-09-18 NOTE — PROCEDURE: ORDER TESTS
Expected Date Of Service: 09/18/2019
Bill For Surgical Tray: no
Billing Type: Third-Party Bill
Performing Laboratory: [id number]

## 2019-09-18 NOTE — PROCEDURE: LIQUID NITROGEN
Number Of Freeze-Thaw Cycles: 3 freeze-thaw cycles
Render Post-Care Instructions In Note?: no
Consent: The patient's consent was obtained including but not limited to risks of crusting, scabbing, blistering, scarring, darker or lighter pigmentary change, recurrence, incomplete removal and infection.
Medical Necessity Information: It is in your best interest to select a reason for this procedure from the list below. All of these items fulfill various CMS LCD requirements except the new and changing color options.
Post-Care Instructions: I reviewed with the patient in detail post-care instructions. Patient is to wear sunprotection, and avoid picking at any of the treated lesions. Pt may apply Vaseline to crusted or scabbing areas.
Detail Level: Detailed
Medical Necessity Clause: This procedure was medically necessary because the lesions that were treated were:
Duration Of Freeze Thaw-Cycle (Seconds): 5
Number Of Freeze-Thaw Cycles: 2 freeze-thaw cycles
Detail Level: Zone

## 2019-11-01 ENCOUNTER — APPOINTMENT (RX ONLY)
Dept: URBAN - METROPOLITAN AREA CLINIC 45 | Facility: CLINIC | Age: 69
Setting detail: DERMATOLOGY
End: 2019-11-01

## 2019-11-01 ENCOUNTER — RX ONLY (OUTPATIENT)
Age: 69
Setting detail: RX ONLY
End: 2019-11-01

## 2019-11-01 DIAGNOSIS — L57.0 ACTINIC KERATOSIS: ICD-10-CM

## 2019-11-01 PROCEDURE — ? COUNSELING

## 2019-11-01 PROCEDURE — 96567 PDT DSTR PRMLG LES SKN: CPT

## 2019-11-01 PROCEDURE — ? PRESCRIPTION

## 2019-11-01 PROCEDURE — ? PDT: BLUE

## 2019-11-01 RX ORDER — SULFAMETHOXAZOLE AND TRIMETHOPRIM 800; 160 MG/1; MG/1
TABLET ORAL
Qty: 10 | Refills: 0 | Status: CANCELLED

## 2019-11-01 RX ORDER — VALACYCLOVIR HYDROCHLORIDE 1 G/1
TABLET, FILM COATED ORAL
Qty: 30 | Refills: 0 | Status: ERX | COMMUNITY
Start: 2019-11-01

## 2019-11-01 RX ORDER — LEVOFLOXACIN 500 MG/1
TABLET, FILM COATED ORAL
Qty: 10 | Refills: 0 | Status: ERX | COMMUNITY
Start: 2019-11-01

## 2019-11-01 RX ORDER — PREDNISONE 20 MG/1
TABLET ORAL
Qty: 5 | Refills: 0 | Status: ERX | COMMUNITY
Start: 2019-11-01

## 2019-11-01 RX ADMIN — VALACYCLOVIR HYDROCHLORIDE: 1 TABLET, FILM COATED ORAL at 00:00

## 2019-11-01 RX ADMIN — PREDNISONE: 20 TABLET ORAL at 00:00

## 2019-11-01 ASSESSMENT — LOCATION SIMPLE DESCRIPTION DERM
LOCATION SIMPLE: LEFT CHEEK
LOCATION SIMPLE: RIGHT CHEEK
LOCATION SIMPLE: LEFT FOREHEAD
LOCATION SIMPLE: INFERIOR FOREHEAD

## 2019-11-01 ASSESSMENT — LOCATION DETAILED DESCRIPTION DERM
LOCATION DETAILED: INFERIOR MID FOREHEAD
LOCATION DETAILED: RIGHT INFERIOR CENTRAL MALAR CHEEK
LOCATION DETAILED: LEFT INFERIOR CENTRAL MALAR CHEEK
LOCATION DETAILED: LEFT MEDIAL FOREHEAD

## 2019-11-01 ASSESSMENT — LOCATION ZONE DERM: LOCATION ZONE: FACE

## 2019-11-01 NOTE — PROCEDURE: PDT: BLUE
Medical Necessity: Precancerous Lesions
Who Performed The Pdt?: Performed by Nurse, MA or Aesthetician ([id number])
Show Medical Necessity In Plan?: Yes
Anesthesia Volume In Cc: 0
Detail Level: Zone
Consent: Written consent obtained.  The risks were reviewed with the patient including but not limited to: pigmentary changes, pain, blistering, scabbing, redness, and the remote possibility of scarring.
Incubation Time: 02:00:00
Illumination Time: 00:16:40
Post-Care Instructions: I reviewed with the patient in detail post-care instructions. Patient is to avoid sunlight for the next 2 days, and wear sun protection. Patients may expect sunburn like redness, discomfort and scabbing.
Number Of Kerasticks/Tubes Billed For: 1
Light Source: Germán-U
Was Levulan/Ameluz Applied On A Previous Day?: No
Debridement Text (Will Only Render In Visit Note If You Select Debridement Option Under Who Performed The Pdt Field): Prior to application of the photodynamic medication the hyperkeratotic lesions were curetted to make them more amenable to therapy.
Anesthesia Type: 1% lidocaine with epinephrine
Pre-Procedure Text: The treatment areas were cleaned and prepped in the usual fashion with ethyl alcohol.
Which Photosensitizer Was Used: Levulan

## 2020-01-22 ENCOUNTER — RX ONLY (OUTPATIENT)
Age: 70
Setting detail: RX ONLY
End: 2020-01-22

## 2020-01-22 ENCOUNTER — APPOINTMENT (RX ONLY)
Dept: URBAN - METROPOLITAN AREA CLINIC 45 | Facility: CLINIC | Age: 70
Setting detail: DERMATOLOGY
End: 2020-01-22

## 2020-01-22 DIAGNOSIS — L98.8 OTHER SPECIFIED DISORDERS OF THE SKIN AND SUBCUTANEOUS TISSUE: ICD-10-CM

## 2020-01-22 DIAGNOSIS — Z85.828 PERSONAL HISTORY OF OTHER MALIGNANT NEOPLASM OF SKIN: ICD-10-CM

## 2020-01-22 DIAGNOSIS — L40.0 PSORIASIS VULGARIS: ICD-10-CM

## 2020-01-22 PROCEDURE — ? TREATMENT REGIMEN

## 2020-01-22 PROCEDURE — ? OTEZLA MONITORING

## 2020-01-22 PROCEDURE — ? MEDICAL CONSULTATION: FILLERS

## 2020-01-22 PROCEDURE — 99213 OFFICE O/P EST LOW 20 MIN: CPT

## 2020-01-22 PROCEDURE — ? COUNSELING

## 2020-01-22 RX ORDER — CLOBETASOL PROPIONATE 0.5 MG/G
AEROSOL, FOAM TOPICAL
Qty: 1 | Refills: 3 | Status: ERX

## 2020-01-22 ASSESSMENT — LOCATION ZONE DERM: LOCATION ZONE: NOSE

## 2020-01-22 ASSESSMENT — LOCATION DETAILED DESCRIPTION DERM
LOCATION DETAILED: NASAL DORSUM
LOCATION DETAILED: NASAL ROOT

## 2020-01-22 ASSESSMENT — LOCATION SIMPLE DESCRIPTION DERM: LOCATION SIMPLE: NOSE

## 2020-01-22 NOTE — PROCEDURE: TREATMENT REGIMEN
Action 1: Continue
Detail Level: Zone
Other Instructions: Location: Body\\nPharmacy: [facility]\\nWeight: 130\\nHeight: 5'5\"\\nBSA: 5%\\nFull Labs including TB: 5/2018\\n*WILL NOT CHECK LABS YET SINCE SHE HAS DISCONTINUED THE OTEZLA\\nTREATMENT: PATIENT HAS DISCONTINUED OTEZLA \\nCONT: Hydroxyzine HCL 25mg QHS to relieve itch\\nCONT: Olux-E .05% topical Foam PRN when having flare up\\nCONT: Vytone Cream, 1 TOP YOLI PRN when having flare up (for face and ears)\\nCONT: Cetirizine 10mg tablets po QD\\nCONT: Claritin 10mg daily instead of PRN \\n\\nPt is here for f/u.\\nPt discussed that she stopped taking the Otezla a few weeks before Thanksgiving, due to a severe upper respiratory infection. \\nPatient states that her PCP recommended she go off of it, and she has been off the medication ever since. \\nPatient states that she made some healthy changes to her diet, and thinks that this is the main reason that her skin has stayed under control.\\nPatient is also taking Claritin once daily and adjusting her diet with benefit\\nPatient notes she is still taking Zofran for her nausea \\nToday, pt states that she has a small flare on her neck that is treating with Olux-E with benefit\\n\\nPt discussed that she is taking Cetirizine 10mg BID as well\\nF/u in 4 months
Plan: Location: hands\\n\\nCan treat with Bellafill, recommended two syringes at $750 a piece

## 2022-01-05 ENCOUNTER — APPOINTMENT (RX ONLY)
Dept: URBAN - METROPOLITAN AREA CLINIC 45 | Facility: CLINIC | Age: 72
Setting detail: DERMATOLOGY
End: 2022-01-05

## 2022-01-05 DIAGNOSIS — L20.89 OTHER ATOPIC DERMATITIS: ICD-10-CM

## 2022-01-05 DIAGNOSIS — L82.0 INFLAMED SEBORRHEIC KERATOSIS: ICD-10-CM

## 2022-01-05 DIAGNOSIS — D18.0 HEMANGIOMA: ICD-10-CM

## 2022-01-05 DIAGNOSIS — L50.3 DERMATOGRAPHIC URTICARIA: ICD-10-CM

## 2022-01-05 PROBLEM — D18.01 HEMANGIOMA OF SKIN AND SUBCUTANEOUS TISSUE: Status: ACTIVE | Noted: 2022-01-05

## 2022-01-05 PROBLEM — D48.5 NEOPLASM OF UNCERTAIN BEHAVIOR OF SKIN: Status: ACTIVE | Noted: 2022-01-05

## 2022-01-05 PROBLEM — L20.84 INTRINSIC (ALLERGIC) ECZEMA: Status: ACTIVE | Noted: 2022-01-05

## 2022-01-05 PROCEDURE — ? TREATMENT REGIMEN

## 2022-01-05 PROCEDURE — ? COUNSELING

## 2022-01-05 PROCEDURE — 17110 DESTRUCTION B9 LES UP TO 14: CPT

## 2022-01-05 PROCEDURE — 11102 TANGNTL BX SKIN SINGLE LES: CPT | Mod: 59

## 2022-01-05 PROCEDURE — ? LIQUID NITROGEN

## 2022-01-05 PROCEDURE — 99213 OFFICE O/P EST LOW 20 MIN: CPT | Mod: 25

## 2022-01-05 PROCEDURE — ? BIOPSY BY SHAVE METHOD

## 2022-01-05 PROCEDURE — ? PRESCRIPTION

## 2022-01-05 RX ORDER — CLOBETASOL PROPIONATE 0.5 MG/G
AEROSOL, FOAM TOPICAL
Qty: 100 | Refills: 2 | Status: ERX | COMMUNITY
Start: 2022-01-05

## 2022-01-05 RX ADMIN — CLOBETASOL PROPIONATE: 0.5 AEROSOL, FOAM TOPICAL at 00:00

## 2022-01-05 ASSESSMENT — LOCATION ZONE DERM
LOCATION ZONE: TRUNK
LOCATION ZONE: LEG

## 2022-01-05 ASSESSMENT — LOCATION SIMPLE DESCRIPTION DERM
LOCATION SIMPLE: RIGHT UPPER BACK
LOCATION SIMPLE: RIGHT KNEE
LOCATION SIMPLE: ABDOMEN

## 2022-01-05 ASSESSMENT — LOCATION DETAILED DESCRIPTION DERM
LOCATION DETAILED: RIGHT SUPERIOR MEDIAL UPPER BACK
LOCATION DETAILED: RIGHT RIB CAGE
LOCATION DETAILED: RIGHT KNEE

## 2022-01-05 NOTE — PROCEDURE: TREATMENT REGIMEN
Plan: Location: body\\n\\nDermatographism was drawn to detect histamine level. \\nPatient has high histamine level and advised to start taking Allegra BID, especially during allergy season.\\nF/u as needed
Detail Level: Zone
Plan: Location: upper back\\nStart using: Olux E 0.05% foam\\nRefill: Tovet 0.05% foam\\n\\nPatient presents with a dry itchy patch on her upper back that she finds bothersome \\n\\nEducated patient that eczema is an inflammatory condition triggered with stress, lack of sleep, and also has a genetic component\\nEducated patient to limit soap to only oily areas of the body to prevent stripping natural oils\\nRecommend using Cerave MC to help reestablish a healthy skin barrier\\nAlso recommended patient to take an antihistamine to help decrease histamine production\\nInstructed to restart using a topical steroid to help soothe and alleviate inflammation \\nWill refill her topical steroid to use whenever she has a flare up

## 2022-01-05 NOTE — PROCEDURE: BIOPSY BY SHAVE METHOD
Body Location Override (Optional - Billing Will Still Be Based On Selected Body Map Location If Applicable): right torso
Detail Level: Detailed
Depth Of Biopsy: dermis
Was A Bandage Applied: Yes
Size Of Lesion In Cm: 0
Biopsy Type: H and E
Biopsy Method: Dermablade
Anesthesia Type: 1% lidocaine with epinephrine
Anesthesia Volume In Cc (Will Not Render If 0): 0.5
Hemostasis: Drysol
Wound Care: Petrolatum
Dressing: bandage
Destruction After The Procedure: No
Type Of Destruction Used: Curettage
Curettage Text: The wound bed was treated with curettage after the biopsy was performed.
Cryotherapy Text: The wound bed was treated with cryotherapy after the biopsy was performed.
Electrodesiccation Text: The wound bed was treated with electrodesiccation after the biopsy was performed.
Electrodesiccation And Curettage Text: The wound bed was treated with electrodesiccation and curettage after the biopsy was performed.
Silver Nitrate Text: The wound bed was treated with silver nitrate after the biopsy was performed.
Consent: Written consent was obtained and risks were reviewed including but not limited to scarring, infection, bleeding, scabbing, incomplete removal, nerve damage and allergy to anesthesia.
Post-Care Instructions: I reviewed with the patient in detail post-care instructions. Patient is to keep the biopsy site dry overnight, and then apply bacitracin twice daily until healed. Patient may apply hydrogen peroxide soaks to remove any crusting.
Notification Instructions: Patient will be notified of biopsy results. However, patient instructed to call the office if not contacted within 2 weeks.
Billing Type: Third-Party Bill
Information: Selecting Yes will display possible errors in your note based on the variables you have selected. This validation is only offered as a suggestion for you. PLEASE NOTE THAT THE VALIDATION TEXT WILL BE REMOVED WHEN YOU FINALIZE YOUR NOTE. IF YOU WANT TO FAX A PRELIMINARY NOTE YOU WILL NEED TO TOGGLE THIS TO 'NO' IF YOU DO NOT WANT IT IN YOUR FAXED NOTE.

## 2022-01-05 NOTE — PROCEDURE: LIQUID NITROGEN
Medical Necessity Clause: This procedure was medically necessary because the lesions that were treated were:
Show Spray Paint Technique Variable?: Yes
Spray Paint Text: The liquid nitrogen was applied to the skin utilizing a spray paint frosting technique.
Spray Paint Technique: No
Consent: The patient's consent was obtained including but not limited to risks of crusting, scabbing, blistering, scarring, darker or lighter pigmentary change, recurrence, incomplete removal and infection.
Post-Care Instructions: I reviewed with the patient in detail post-care instructions. Patient is to wear sunprotection, and avoid picking at any of the treated lesions. Pt may apply Vaseline to crusted or scabbing areas.
Medical Necessity Information: It is in your best interest to select a reason for this procedure from the list below. All of these items fulfill various CMS LCD requirements except the new and changing color options.
Detail Level: Zone
Number Of Freeze-Thaw Cycles: 3 freeze-thaw cycles